# Patient Record
Sex: FEMALE | Race: WHITE | NOT HISPANIC OR LATINO | URBAN - METROPOLITAN AREA
[De-identification: names, ages, dates, MRNs, and addresses within clinical notes are randomized per-mention and may not be internally consistent; named-entity substitution may affect disease eponyms.]

---

## 2017-01-16 ENCOUNTER — APPOINTMENT (RX ONLY)
Dept: URBAN - METROPOLITAN AREA CLINIC 5 | Facility: CLINIC | Age: 77
Setting detail: DERMATOLOGY
End: 2017-01-16

## 2017-01-16 DIAGNOSIS — D22 MELANOCYTIC NEVI: ICD-10-CM

## 2017-01-16 DIAGNOSIS — L90.5 SCAR CONDITIONS AND FIBROSIS OF SKIN: ICD-10-CM

## 2017-01-16 DIAGNOSIS — L82.1 OTHER SEBORRHEIC KERATOSIS: ICD-10-CM

## 2017-01-16 DIAGNOSIS — L81.4 OTHER MELANIN HYPERPIGMENTATION: ICD-10-CM

## 2017-01-16 DIAGNOSIS — I83.9 ASYMPTOMATIC VARICOSE VEINS OF LOWER EXTREMITIES: ICD-10-CM

## 2017-01-16 DIAGNOSIS — Z71.89 OTHER SPECIFIED COUNSELING: ICD-10-CM

## 2017-01-16 DIAGNOSIS — L57.0 ACTINIC KERATOSIS: ICD-10-CM

## 2017-01-16 DIAGNOSIS — D18.0 HEMANGIOMA: ICD-10-CM

## 2017-01-16 PROBLEM — F32.9 MAJOR DEPRESSIVE DISORDER, SINGLE EPISODE, UNSPECIFIED: Status: ACTIVE | Noted: 2017-01-16

## 2017-01-16 PROBLEM — K21.9 GASTRO-ESOPHAGEAL REFLUX DISEASE WITHOUT ESOPHAGITIS: Status: ACTIVE | Noted: 2017-01-16

## 2017-01-16 PROBLEM — D22.72 MELANOCYTIC NEVI OF LEFT LOWER LIMB, INCLUDING HIP: Status: ACTIVE | Noted: 2017-01-16

## 2017-01-16 PROBLEM — D22.71 MELANOCYTIC NEVI OF RIGHT LOWER LIMB, INCLUDING HIP: Status: ACTIVE | Noted: 2017-01-16

## 2017-01-16 PROBLEM — H91.90 UNSPECIFIED HEARING LOSS, UNSPECIFIED EAR: Status: ACTIVE | Noted: 2017-01-16

## 2017-01-16 PROBLEM — D22.61 MELANOCYTIC NEVI OF RIGHT UPPER LIMB, INCLUDING SHOULDER: Status: ACTIVE | Noted: 2017-01-16

## 2017-01-16 PROBLEM — I83.93 ASYMPTOMATIC VARICOSE VEINS OF BILATERAL LOWER EXTREMITIES: Status: ACTIVE | Noted: 2017-01-16

## 2017-01-16 PROBLEM — I10 ESSENTIAL (PRIMARY) HYPERTENSION: Status: ACTIVE | Noted: 2017-01-16

## 2017-01-16 PROBLEM — L70.0 ACNE VULGARIS: Status: ACTIVE | Noted: 2017-01-16

## 2017-01-16 PROBLEM — D22.62 MELANOCYTIC NEVI OF LEFT UPPER LIMB, INCLUDING SHOULDER: Status: ACTIVE | Noted: 2017-01-16

## 2017-01-16 PROBLEM — D18.01 HEMANGIOMA OF SKIN AND SUBCUTANEOUS TISSUE: Status: ACTIVE | Noted: 2017-01-16

## 2017-01-16 PROBLEM — L85.3 XEROSIS CUTIS: Status: ACTIVE | Noted: 2017-01-16

## 2017-01-16 PROBLEM — D22.39 MELANOCYTIC NEVI OF OTHER PARTS OF FACE: Status: ACTIVE | Noted: 2017-01-16

## 2017-01-16 PROBLEM — L57.8 OTHER SKIN CHANGES DUE TO CHRONIC EXPOSURE TO NONIONIZING RADIATION: Status: ACTIVE | Noted: 2017-01-16

## 2017-01-16 PROBLEM — J45.909 UNSPECIFIED ASTHMA, UNCOMPLICATED: Status: ACTIVE | Noted: 2017-01-16

## 2017-01-16 PROBLEM — L20.84 INTRINSIC (ALLERGIC) ECZEMA: Status: ACTIVE | Noted: 2017-01-16

## 2017-01-16 PROBLEM — D22.5 MELANOCYTIC NEVI OF TRUNK: Status: ACTIVE | Noted: 2017-01-16

## 2017-01-16 PROCEDURE — 17003 DESTRUCT PREMALG LES 2-14: CPT

## 2017-01-16 PROCEDURE — ? LIQUID NITROGEN

## 2017-01-16 PROCEDURE — 99203 OFFICE O/P NEW LOW 30 MIN: CPT | Mod: 25

## 2017-01-16 PROCEDURE — 17000 DESTRUCT PREMALG LESION: CPT

## 2017-01-16 PROCEDURE — ? COUNSELING

## 2017-01-16 ASSESSMENT — LOCATION ZONE DERM
LOCATION ZONE: TRUNK
LOCATION ZONE: NOSE
LOCATION ZONE: SCALP
LOCATION ZONE: FACE
LOCATION ZONE: ARM
LOCATION ZONE: HAND
LOCATION ZONE: LEG

## 2017-01-16 ASSESSMENT — LOCATION DETAILED DESCRIPTION DERM
LOCATION DETAILED: RIGHT INFERIOR MEDIAL MALAR CHEEK
LOCATION DETAILED: LEFT RADIAL DORSAL HAND
LOCATION DETAILED: HAIR
LOCATION DETAILED: LEFT LATERAL ABDOMEN
LOCATION DETAILED: LEFT INFERIOR MEDIAL MALAR CHEEK
LOCATION DETAILED: MIDDLE STERNUM
LOCATION DETAILED: LEFT NASAL ALA
LOCATION DETAILED: INFERIOR THORACIC SPINE
LOCATION DETAILED: NASAL TIP
LOCATION DETAILED: PERIUMBILICAL SKIN
LOCATION DETAILED: LEFT VENTRAL PROXIMAL FOREARM
LOCATION DETAILED: RIGHT ANTERIOR DISTAL UPPER ARM
LOCATION DETAILED: RIGHT PROXIMAL PRETIBIAL REGION
LOCATION DETAILED: RIGHT INFERIOR MEDIAL UPPER BACK
LOCATION DETAILED: LEFT MEDIAL UPPER BACK
LOCATION DETAILED: LEFT SUPERIOR UPPER BACK
LOCATION DETAILED: RIGHT MEDIAL BREAST 3-4:00 REGION
LOCATION DETAILED: LEFT PROXIMAL PRETIBIAL REGION
LOCATION DETAILED: SUBXIPHOID

## 2017-01-16 ASSESSMENT — LOCATION SIMPLE DESCRIPTION DERM
LOCATION SIMPLE: RIGHT PRETIBIAL REGION
LOCATION SIMPLE: UPPER BACK
LOCATION SIMPLE: ABDOMEN
LOCATION SIMPLE: NOSE
LOCATION SIMPLE: RIGHT UPPER ARM
LOCATION SIMPLE: RIGHT BREAST
LOCATION SIMPLE: LEFT NOSE
LOCATION SIMPLE: RIGHT UPPER BACK
LOCATION SIMPLE: LEFT FOREARM
LOCATION SIMPLE: LEFT UPPER BACK
LOCATION SIMPLE: HAIR
LOCATION SIMPLE: RIGHT CHEEK
LOCATION SIMPLE: CHEST
LOCATION SIMPLE: LEFT HAND
LOCATION SIMPLE: LEFT CHEEK
LOCATION SIMPLE: LEFT PRETIBIAL REGION

## 2017-01-16 NOTE — PROCEDURE: LIQUID NITROGEN
Render Post-Care Instructions In Note?: no
Duration Of Freeze Thaw-Cycle (Seconds): 0
Number Of Freeze-Thaw Cycles: 1 freeze-thaw cycle
Consent: The patient's consent was obtained including but not limited to risks of crusting, scabbing, blistering, scarring, darker or lighter pigmentary change, recurrence, incomplete removal and infection.
Post-Care Instructions: I reviewed with the patient in detail post-care instructions. Patient is to wear sunprotection, and avoid picking at any of the treated lesions. Pt may apply Vaseline to crusted or scabbing areas.
Detail Level: Zone

## 2017-09-18 ENCOUNTER — APPOINTMENT (RX ONLY)
Dept: URBAN - METROPOLITAN AREA CLINIC 5 | Facility: CLINIC | Age: 77
Setting detail: DERMATOLOGY
End: 2017-09-18

## 2017-09-18 DIAGNOSIS — D18.0 HEMANGIOMA: ICD-10-CM

## 2017-09-18 DIAGNOSIS — D22 MELANOCYTIC NEVI: ICD-10-CM

## 2017-09-18 DIAGNOSIS — B00.1 HERPESVIRAL VESICULAR DERMATITIS: ICD-10-CM

## 2017-09-18 DIAGNOSIS — L82.1 OTHER SEBORRHEIC KERATOSIS: ICD-10-CM

## 2017-09-18 PROBLEM — D18.01 HEMANGIOMA OF SKIN AND SUBCUTANEOUS TISSUE: Status: ACTIVE | Noted: 2017-09-18

## 2017-09-18 PROBLEM — D48.5 NEOPLASM OF UNCERTAIN BEHAVIOR OF SKIN: Status: ACTIVE | Noted: 2017-09-18

## 2017-09-18 PROBLEM — D22.5 MELANOCYTIC NEVI OF TRUNK: Status: ACTIVE | Noted: 2017-09-18

## 2017-09-18 PROCEDURE — 99213 OFFICE O/P EST LOW 20 MIN: CPT | Mod: 25

## 2017-09-18 PROCEDURE — ? DIAGNOSIS COMMENT

## 2017-09-18 PROCEDURE — 11100: CPT

## 2017-09-18 PROCEDURE — ? BIOPSY BY SHAVE METHOD

## 2017-09-18 PROCEDURE — ? COUNSELING

## 2017-09-18 ASSESSMENT — LOCATION SIMPLE DESCRIPTION DERM
LOCATION SIMPLE: ABDOMEN
LOCATION SIMPLE: RIGHT SHOULDER
LOCATION SIMPLE: RIGHT LOWER BACK
LOCATION SIMPLE: LEFT UPPER BACK
LOCATION SIMPLE: LEFT LIP

## 2017-09-18 ASSESSMENT — LOCATION DETAILED DESCRIPTION DERM
LOCATION DETAILED: RIGHT ANTERIOR SHOULDER
LOCATION DETAILED: PERIUMBILICAL SKIN
LOCATION DETAILED: LEFT RIB CAGE
LOCATION DETAILED: LEFT LOWER CUTANEOUS LIP
LOCATION DETAILED: LEFT MID-UPPER BACK
LOCATION DETAILED: RIGHT INFERIOR LATERAL MIDBACK

## 2017-09-18 ASSESSMENT — LOCATION ZONE DERM
LOCATION ZONE: ARM
LOCATION ZONE: TRUNK
LOCATION ZONE: LIP

## 2017-09-18 NOTE — PROCEDURE: BIOPSY BY SHAVE METHOD
Size Of Lesion In Cm: 0
Detail Level: Detailed
Lab Facility: [id number]
Post-Care Instructions: I reviewed with the patient in detail post-care instructions. Patient is to keep the biopsy site dry overnight, and then apply bacitracin twice daily until healed. Patient may apply hydrogen peroxide soaks to remove any crusting.
Wound Care: Bacitracin
Consent: Written consent was obtained and risks were reviewed including but not limited to scarring, infection, bleeding, scabbing, incomplete removal, nerve damage and allergy to anesthesia.
Destruction After The Procedure: No
Biopsy Type: H and E
Hemostasis: Drysol
Billing Type: Third-Party Bill
Electrodesiccation And Curettage Text: The wound bed was treated with electrodesiccation and curettage after the biopsy was performed.
Biopsy Method: Dermablade
Cryotherapy Text: The wound bed was treated with cryotherapy after the biopsy was performed.
Type Of Destruction Used: Curettage
Notification Instructions: Patient will be notified of biopsy results. However, patient instructed to call the office if not contacted within 2 weeks.
Curettage Text: The wound bed was treated with curettage after the biopsy was performed.
Silver Nitrate Text: The wound bed was treated with silver nitrate after the biopsy was performed.
Electrodesiccation Text: The wound bed was treated with electrodesiccation after the biopsy was performed.
Anesthesia Volume In Cc: 0.5
Anesthesia Type: 1% lidocaine with epinephrine
Dressing: bandage